# Patient Record
Sex: MALE | Race: WHITE
[De-identification: names, ages, dates, MRNs, and addresses within clinical notes are randomized per-mention and may not be internally consistent; named-entity substitution may affect disease eponyms.]

---

## 2020-10-09 NOTE — NUR
Report called to receiving nurse. Transferred via BED Belongings . 

Special Issues communicated to receiving nurse. AWAKE AND ORIENTED. VITALS STABLE. STATES 
PAIN IMPROVING. TO SURGICAL RM 346A AT THIS TIME.

## 2020-10-09 NOTE — NUR
Patient in room [facility] 346. I have received report from  NITHYA Wright  and had the opportunity to 
ask questions and assume patient care.

## 2020-10-09 NOTE — NUR
Problems reprioritized. Patient report given, questions answered & plan of care reviewed 
with NITHYA ZHANG.

## 2020-10-09 NOTE — NUR
Received from OR via , accompanied by Anesthesiologist DR ANDRADE and report given by 
Anesthesiolgist. AWAKENS TO VOICE. VITALS STABLE. JORDAN PAIN. SENSATION AT THE HIPS. CBI 
WITH PALE CLEAR PINK IN BAG.

## 2020-10-10 NOTE — NUR
Pt w/CBI, c/o abdominal pain. Per order, hand irrigated and small amt of blood clots noted 
during irrigation. Pt tolerated well. Will continue to monitor.

## 2020-10-10 NOTE — NUR
Problems reprioritized. Patient report given, questions answered & plan of care reviewed 
with NITHYA Chavarria.

## 2020-10-10 NOTE — NUR
Patient in room [facility] 346. I have received report from NITHYA Lopez and had the opportunity to 
ask questions and assume patient care.

## 2021-09-07 ENCOUNTER — HOSPITAL ENCOUNTER (OUTPATIENT)
Dept: HOSPITAL 94 - PRE-OP | Age: 71
Discharge: HOME | End: 2021-09-07
Attending: ORTHOPAEDIC SURGERY
Payer: MEDICARE

## 2021-09-07 VITALS — HEIGHT: 74 IN | BODY MASS INDEX: 30.03 KG/M2 | WEIGHT: 234 LBS

## 2021-09-07 DIAGNOSIS — R00.1: ICD-10-CM

## 2021-09-07 DIAGNOSIS — M25.511: ICD-10-CM

## 2021-09-07 DIAGNOSIS — Z20.822: ICD-10-CM

## 2021-09-07 DIAGNOSIS — Z01.812: Primary | ICD-10-CM

## 2021-09-07 DIAGNOSIS — M19.011: ICD-10-CM

## 2021-09-07 LAB
ALBUMIN SERPL BCP-MCNC: 3.7 G/DL (ref 3.4–5)
ALBUMIN/GLOB SERPL: 1 {RATIO} (ref 1.1–1.5)
ALP SERPL-CCNC: 88 IU/L (ref 46–116)
ALT SERPL W P-5'-P-CCNC: 23 U/L (ref 30–65)
ANION GAP SERPL CALCULATED.3IONS-SCNC: 9 MMOL/L (ref 8–16)
AST SERPL W P-5'-P-CCNC: 17 U/L (ref 10–37)
BASOPHILS # BLD AUTO: 0.1 X10'3 (ref 0–0.2)
BASOPHILS NFR BLD AUTO: 0.7 % (ref 0–1)
BILIRUB SERPL-MCNC: 0.7 MG/DL (ref 0–1)
BUN SERPL-MCNC: 16 MG/DL (ref 7–18)
BUN/CREAT SERPL: 10.7 (ref 5.4–32)
CALCIUM SERPL-MCNC: 8.4 MG/DL (ref 8.5–10.1)
CHLORIDE SERPL-SCNC: 107 MMOL/L (ref 99–107)
CO2 SERPL-SCNC: 28.7 MMOL/L (ref 24–32)
CREAT SERPL-MCNC: 1.49 MG/DL (ref 0.6–1.1)
EOSINOPHIL # BLD AUTO: 0 X10'3 (ref 0–0.9)
EOSINOPHIL NFR BLD AUTO: 0.4 % (ref 0–6)
ERYTHROCYTE [DISTWIDTH] IN BLOOD BY AUTOMATED COUNT: 13.9 % (ref 11.5–14.5)
GFR SERPL CREATININE-BSD FRML MDRD: 46 ML/MIN
GLUCOSE SERPL-MCNC: 110 MG/DL (ref 70–104)
HBA1C MFR BLD: 5.9 % (ref 4.5–6.2)
HCT VFR BLD AUTO: 45.3 % (ref 42–52)
HGB BLD-MCNC: 14.8 G/DL (ref 14–17.9)
LYMPHOCYTES # BLD AUTO: 1.6 X10'3 (ref 1.1–4.8)
LYMPHOCYTES NFR BLD AUTO: 19.3 % (ref 21–51)
MCH RBC QN AUTO: 31.2 PG (ref 27–31)
MCHC RBC AUTO-ENTMCNC: 32.6 G/DL (ref 33–36.5)
MCV RBC AUTO: 95.8 FL (ref 78–98)
MONOCYTES # BLD AUTO: 0.5 X10'3 (ref 0–0.9)
MONOCYTES NFR BLD AUTO: 6.1 % (ref 2–12)
NEUTROPHILS # BLD AUTO: 6 X10'3 (ref 1.8–7.7)
NEUTROPHILS NFR BLD AUTO: 73.5 % (ref 42–75)
PLATELET # BLD AUTO: 172 X10'3 (ref 140–440)
PMV BLD AUTO: 10.3 FL (ref 7.4–10.4)
POTASSIUM SERPL-SCNC: 3.9 MMOL/L (ref 3.4–5.1)
PROT SERPL-MCNC: 7.5 G/DL (ref 6.4–8.2)
RBC # BLD AUTO: 4.73 X10'6 (ref 4.7–6.1)
SODIUM SERPL-SCNC: 145 MMOL/L (ref 135–145)

## 2021-09-07 PROCEDURE — 83036 HEMOGLOBIN GLYCOSYLATED A1C: CPT

## 2021-09-07 PROCEDURE — 85025 COMPLETE CBC W/AUTO DIFF WBC: CPT

## 2021-09-07 PROCEDURE — 71046 X-RAY EXAM CHEST 2 VIEWS: CPT

## 2021-09-07 PROCEDURE — 93005 ELECTROCARDIOGRAM TRACING: CPT

## 2021-09-07 PROCEDURE — 87081 CULTURE SCREEN ONLY: CPT

## 2021-09-07 PROCEDURE — 36415 COLL VENOUS BLD VENIPUNCTURE: CPT

## 2021-09-07 PROCEDURE — 80053 COMPREHEN METABOLIC PANEL: CPT

## 2021-12-07 LAB
ALBUMIN SERPL BCP-MCNC: 3.7 G/DL (ref 3.4–5)
ALBUMIN/GLOB SERPL: 0.9 {RATIO} (ref 1.1–1.5)
ALP SERPL-CCNC: 78 IU/L (ref 46–116)
ALT SERPL W P-5'-P-CCNC: 31 U/L (ref 30–65)
ANION GAP SERPL CALCULATED.3IONS-SCNC: 10 MMOL/L (ref 8–16)
AST SERPL W P-5'-P-CCNC: 18 U/L (ref 10–37)
BASOPHILS # BLD AUTO: 0.1 X10'3 (ref 0–0.2)
BASOPHILS NFR BLD AUTO: 0.6 % (ref 0–1)
BILIRUB SERPL-MCNC: 0.6 MG/DL (ref 0–1)
BUN SERPL-MCNC: 16 MG/DL (ref 7–18)
BUN/CREAT SERPL: 11.9 (ref 5.4–32)
CALCIUM SERPL-MCNC: 8.6 MG/DL (ref 8.5–10.1)
CHLORIDE SERPL-SCNC: 105 MMOL/L (ref 99–107)
CO2 SERPL-SCNC: 27.5 MMOL/L (ref 24–32)
CREAT SERPL-MCNC: 1.34 MG/DL (ref 0.6–1.1)
EOSINOPHIL # BLD AUTO: 0.1 X10'3 (ref 0–0.9)
EOSINOPHIL NFR BLD AUTO: 0.9 % (ref 0–6)
ERYTHROCYTE [DISTWIDTH] IN BLOOD BY AUTOMATED COUNT: 13.5 % (ref 11.5–14.5)
GFR SERPL CREATININE-BSD FRML MDRD: 52 ML/MIN
GLUCOSE SERPL-MCNC: 83 MG/DL (ref 70–104)
HBA1C MFR BLD: 6.1 % (ref 4.5–6.2)
HCT VFR BLD AUTO: 44 % (ref 42–52)
HGB BLD-MCNC: 14.8 G/DL (ref 14–17.9)
LYMPHOCYTES # BLD AUTO: 1.9 X10'3 (ref 1.1–4.8)
LYMPHOCYTES NFR BLD AUTO: 20.9 % (ref 21–51)
MCH RBC QN AUTO: 31.8 PG (ref 27–31)
MCHC RBC AUTO-ENTMCNC: 33.5 G/DL (ref 33–36.5)
MCV RBC AUTO: 94.8 FL (ref 78–98)
MONOCYTES # BLD AUTO: 0.8 X10'3 (ref 0–0.9)
MONOCYTES NFR BLD AUTO: 9.1 % (ref 2–12)
NEUTROPHILS # BLD AUTO: 6.3 X10'3 (ref 1.8–7.7)
NEUTROPHILS NFR BLD AUTO: 68.5 % (ref 42–75)
PLATELET # BLD AUTO: 173 X10'3 (ref 140–440)
PMV BLD AUTO: 9.7 FL (ref 7.4–10.4)
POTASSIUM SERPL-SCNC: 4.2 MMOL/L (ref 3.4–5.1)
PROT SERPL-MCNC: 7.8 G/DL (ref 6.4–8.2)
RBC # BLD AUTO: 4.64 X10'6 (ref 4.7–6.1)
SODIUM SERPL-SCNC: 142 MMOL/L (ref 135–145)

## 2021-12-13 ENCOUNTER — HOSPITAL ENCOUNTER (INPATIENT)
Dept: HOSPITAL 94 - PAS IN | Age: 71
LOS: 2 days | Discharge: HOME HEALTH | DRG: 322 | End: 2021-12-15
Attending: ORTHOPAEDIC SURGERY | Admitting: ORTHOPAEDIC SURGERY
Payer: MEDICAID

## 2021-12-13 VITALS — DIASTOLIC BLOOD PRESSURE: 100 MMHG | SYSTOLIC BLOOD PRESSURE: 154 MMHG

## 2021-12-13 VITALS — DIASTOLIC BLOOD PRESSURE: 75 MMHG | SYSTOLIC BLOOD PRESSURE: 125 MMHG

## 2021-12-13 VITALS — DIASTOLIC BLOOD PRESSURE: 75 MMHG | SYSTOLIC BLOOD PRESSURE: 121 MMHG

## 2021-12-13 VITALS — DIASTOLIC BLOOD PRESSURE: 81 MMHG | SYSTOLIC BLOOD PRESSURE: 135 MMHG

## 2021-12-13 VITALS — DIASTOLIC BLOOD PRESSURE: 77 MMHG | SYSTOLIC BLOOD PRESSURE: 122 MMHG

## 2021-12-13 VITALS — SYSTOLIC BLOOD PRESSURE: 139 MMHG | DIASTOLIC BLOOD PRESSURE: 85 MMHG

## 2021-12-13 VITALS — DIASTOLIC BLOOD PRESSURE: 77 MMHG | SYSTOLIC BLOOD PRESSURE: 120 MMHG

## 2021-12-13 VITALS — WEIGHT: 240 LBS | BODY MASS INDEX: 30.8 KG/M2 | HEIGHT: 74 IN

## 2021-12-13 VITALS — SYSTOLIC BLOOD PRESSURE: 127 MMHG | DIASTOLIC BLOOD PRESSURE: 82 MMHG

## 2021-12-13 VITALS — SYSTOLIC BLOOD PRESSURE: 165 MMHG | DIASTOLIC BLOOD PRESSURE: 104 MMHG

## 2021-12-13 VITALS — DIASTOLIC BLOOD PRESSURE: 74 MMHG | SYSTOLIC BLOOD PRESSURE: 125 MMHG

## 2021-12-13 VITALS — DIASTOLIC BLOOD PRESSURE: 94 MMHG | SYSTOLIC BLOOD PRESSURE: 135 MMHG

## 2021-12-13 VITALS — DIASTOLIC BLOOD PRESSURE: 82 MMHG | SYSTOLIC BLOOD PRESSURE: 125 MMHG

## 2021-12-13 VITALS — DIASTOLIC BLOOD PRESSURE: 72 MMHG | SYSTOLIC BLOOD PRESSURE: 141 MMHG

## 2021-12-13 VITALS — SYSTOLIC BLOOD PRESSURE: 119 MMHG | DIASTOLIC BLOOD PRESSURE: 84 MMHG

## 2021-12-13 VITALS — SYSTOLIC BLOOD PRESSURE: 123 MMHG | DIASTOLIC BLOOD PRESSURE: 83 MMHG

## 2021-12-13 VITALS — SYSTOLIC BLOOD PRESSURE: 148 MMHG | DIASTOLIC BLOOD PRESSURE: 99 MMHG

## 2021-12-13 VITALS — SYSTOLIC BLOOD PRESSURE: 142 MMHG | DIASTOLIC BLOOD PRESSURE: 89 MMHG

## 2021-12-13 VITALS — SYSTOLIC BLOOD PRESSURE: 115 MMHG | DIASTOLIC BLOOD PRESSURE: 81 MMHG

## 2021-12-13 VITALS — SYSTOLIC BLOOD PRESSURE: 128 MMHG | DIASTOLIC BLOOD PRESSURE: 84 MMHG

## 2021-12-13 VITALS — SYSTOLIC BLOOD PRESSURE: 137 MMHG | DIASTOLIC BLOOD PRESSURE: 89 MMHG

## 2021-12-13 VITALS — DIASTOLIC BLOOD PRESSURE: 83 MMHG | SYSTOLIC BLOOD PRESSURE: 122 MMHG

## 2021-12-13 VITALS — SYSTOLIC BLOOD PRESSURE: 144 MMHG | DIASTOLIC BLOOD PRESSURE: 93 MMHG

## 2021-12-13 VITALS — DIASTOLIC BLOOD PRESSURE: 86 MMHG | SYSTOLIC BLOOD PRESSURE: 127 MMHG

## 2021-12-13 DIAGNOSIS — Z79.899: ICD-10-CM

## 2021-12-13 DIAGNOSIS — M65.811: ICD-10-CM

## 2021-12-13 DIAGNOSIS — M19.011: Primary | ICD-10-CM

## 2021-12-13 DIAGNOSIS — M75.101: ICD-10-CM

## 2021-12-13 DIAGNOSIS — D62: ICD-10-CM

## 2021-12-13 PROCEDURE — 3E0T33Z INTRODUCTION OF ANTI-INFLAMMATORY INTO PERIPHERAL NERVES AND PLEXI, PERCUTANEOUS APPROACH: ICD-10-PCS | Performed by: ANESTHESIOLOGY

## 2021-12-13 PROCEDURE — 97161 PT EVAL LOW COMPLEX 20 MIN: CPT

## 2021-12-13 PROCEDURE — 82948 REAGENT STRIP/BLOOD GLUCOSE: CPT

## 2021-12-13 PROCEDURE — 97110 THERAPEUTIC EXERCISES: CPT

## 2021-12-13 PROCEDURE — 3E0T3BZ INTRODUCTION OF ANESTHETIC AGENT INTO PERIPHERAL NERVES AND PLEXI, PERCUTANEOUS APPROACH: ICD-10-PCS | Performed by: ANESTHESIOLOGY

## 2021-12-13 PROCEDURE — 0RRJ00Z REPLACEMENT OF RIGHT SHOULDER JOINT WITH REVERSE BALL AND SOCKET SYNTHETIC SUBSTITUTE, OPEN APPROACH: ICD-10-PCS | Performed by: ORTHOPAEDIC SURGERY

## 2021-12-13 PROCEDURE — 80051 ELECTROLYTE PANEL: CPT

## 2021-12-13 PROCEDURE — 97530 THERAPEUTIC ACTIVITIES: CPT

## 2021-12-13 PROCEDURE — 85025 COMPLETE CBC W/AUTO DIFF WBC: CPT

## 2021-12-13 PROCEDURE — 80053 COMPREHEN METABOLIC PANEL: CPT

## 2021-12-13 PROCEDURE — 87081 CULTURE SCREEN ONLY: CPT

## 2021-12-13 PROCEDURE — 97116 GAIT TRAINING THERAPY: CPT

## 2021-12-13 PROCEDURE — 36415 COLL VENOUS BLD VENIPUNCTURE: CPT

## 2021-12-13 PROCEDURE — 83036 HEMOGLOBIN GLYCOSYLATED A1C: CPT

## 2021-12-13 PROCEDURE — 0LS30ZZ REPOSITION RIGHT UPPER ARM TENDON, OPEN APPROACH: ICD-10-PCS | Performed by: ORTHOPAEDIC SURGERY

## 2021-12-13 RX ADMIN — MEPERIDINE HYDROCHLORIDE PRN MG: 25 INJECTION, SOLUTION INTRAMUSCULAR; INTRAVENOUS; SUBCUTANEOUS at 14:10

## 2021-12-13 RX ADMIN — OXYCODONE PRN MG: 5 TABLET ORAL at 17:31

## 2021-12-13 RX ADMIN — SODIUM CHLORIDE AND POTASSIUM CHLORIDE SCH MLS/HR: 4.5; 1.49 INJECTION, SOLUTION INTRAVENOUS at 20:48

## 2021-12-13 RX ADMIN — MEPERIDINE HYDROCHLORIDE PRN MG: 25 INJECTION, SOLUTION INTRAMUSCULAR; INTRAVENOUS; SUBCUTANEOUS at 15:23

## 2021-12-13 RX ADMIN — CEFAZOLIN SODIUM SCH MLS/HR: 1 SOLUTION INTRAVENOUS at 17:30

## 2021-12-13 RX ADMIN — DOCUSATE SODIUM SCH MG: 100 CAPSULE, LIQUID FILLED ORAL at 19:46

## 2021-12-13 RX ADMIN — ROPIVACAINE HYDROCHLORIDE SCH MLS/HR: 2 INJECTION, SOLUTION EPIDURAL; INFILTRATION; PERINEURAL at 14:38

## 2021-12-13 RX ADMIN — SODIUM CHLORIDE AND POTASSIUM CHLORIDE SCH MLS/HR: 4.5; 1.49 INJECTION, SOLUTION INTRAVENOUS at 14:25

## 2021-12-13 NOTE — NUR
Problems reprioritized. Patient report given, questions answered & plan of care reviewed 
with Manasa bhatia.

## 2021-12-13 NOTE — NUR
Patient in room [facility] 349A. I have received report from  NITHYA Bourne and had the opportunity 
to ask questions and assume patient care.

## 2021-12-13 NOTE — NUR
Patient in room PAS . I have received report from Milena bhatia   and had the opportunity to 
ask questions and assume patient care.

## 2021-12-13 NOTE — NUR
Received from OR via BED, accompanied by Anesthesiologist DR GEORGE and report given by 
Anesthesiologist AND OR RN. PT DROWSY, NO S/S OF DISTRESS/DISCOMFORT. RIGHT SHOULDER W/DRSG 
CDI, SHOULDER WRAP, ICE PACK, IMMOBILIZER IN PLACE. 

-------------------------------------------------------------------------------

Addendum: 12/13/21 at 1424 by Milena Rey RN

-------------------------------------------------------------------------------

Amended: Links added.

## 2021-12-13 NOTE — NUR
Report called to receiving nurse. Transferred via BED BY ORDERLALITO'S W/2 BAGS OF BELONGINGS 
SENT TO ROOM 349A, RECEIVING RN NOTIFIED OF PTS ARRIVAL. PT STATES PAIN IS IMPROVING.

Special Issues communicated to receiving nurse. YES.

-------------------------------------------------------------------------------

Addendum: 12/13/21 at 1633 by Milena Rey RN

-------------------------------------------------------------------------------

Amended: Links added.

## 2021-12-14 VITALS — SYSTOLIC BLOOD PRESSURE: 147 MMHG | DIASTOLIC BLOOD PRESSURE: 96 MMHG

## 2021-12-14 VITALS — DIASTOLIC BLOOD PRESSURE: 88 MMHG | SYSTOLIC BLOOD PRESSURE: 130 MMHG

## 2021-12-14 VITALS — SYSTOLIC BLOOD PRESSURE: 129 MMHG | DIASTOLIC BLOOD PRESSURE: 90 MMHG

## 2021-12-14 VITALS — SYSTOLIC BLOOD PRESSURE: 160 MMHG | DIASTOLIC BLOOD PRESSURE: 63 MMHG

## 2021-12-14 VITALS — DIASTOLIC BLOOD PRESSURE: 66 MMHG | SYSTOLIC BLOOD PRESSURE: 121 MMHG

## 2021-12-14 LAB
ANION GAP SERPL CALCULATED.3IONS-SCNC: 9 MMOL/L (ref 8–16)
BASOPHILS # BLD AUTO: 0 X10'3 (ref 0–0.2)
BASOPHILS NFR BLD AUTO: 0.1 % (ref 0–1)
CHLORIDE SERPL-SCNC: 103 MMOL/L (ref 99–107)
CO2 SERPL-SCNC: 25.7 MMOL/L (ref 24–32)
EOSINOPHIL # BLD AUTO: 0 X10'3 (ref 0–0.9)
EOSINOPHIL NFR BLD AUTO: 0 % (ref 0–6)
ERYTHROCYTE [DISTWIDTH] IN BLOOD BY AUTOMATED COUNT: 13.6 % (ref 11.5–14.5)
HCT VFR BLD AUTO: 40.2 % (ref 42–52)
HGB BLD-MCNC: 13.2 G/DL (ref 14–17.9)
LYMPHOCYTES # BLD AUTO: 0.9 X10'3 (ref 1.1–4.8)
LYMPHOCYTES NFR BLD AUTO: 6.4 % (ref 21–51)
MCH RBC QN AUTO: 31.6 PG (ref 27–31)
MCHC RBC AUTO-ENTMCNC: 33 G/DL (ref 33–36.5)
MCV RBC AUTO: 95.8 FL (ref 78–98)
MONOCYTES # BLD AUTO: 0.8 X10'3 (ref 0–0.9)
MONOCYTES NFR BLD AUTO: 5.8 % (ref 2–12)
NEUTROPHILS # BLD AUTO: 12.7 X10'3 (ref 1.8–7.7)
NEUTROPHILS NFR BLD AUTO: 87.7 % (ref 42–75)
PLATELET # BLD AUTO: 174 X10'3 (ref 140–440)
PMV BLD AUTO: 10.2 FL (ref 7.4–10.4)
POTASSIUM SERPL-SCNC: 4.3 MMOL/L (ref 3.5–5.1)
RBC # BLD AUTO: 4.19 X10'6 (ref 4.7–6.1)
SODIUM SERPL-SCNC: 138 MMOL/L (ref 135–145)
WBC # BLD AUTO: 14.5 X10'3 (ref 4.5–11)

## 2021-12-14 RX ADMIN — DOCUSATE SODIUM SCH MG: 100 CAPSULE, LIQUID FILLED ORAL at 10:13

## 2021-12-14 RX ADMIN — SODIUM CHLORIDE AND POTASSIUM CHLORIDE SCH MLS/HR: 4.5; 1.49 INJECTION, SOLUTION INTRAVENOUS at 14:25

## 2021-12-14 RX ADMIN — DOCUSATE SODIUM SCH MG: 100 CAPSULE, LIQUID FILLED ORAL at 20:35

## 2021-12-14 RX ADMIN — OXYCODONE PRN MG: 5 TABLET ORAL at 15:50

## 2021-12-14 RX ADMIN — OXYCODONE PRN MG: 5 TABLET ORAL at 10:14

## 2021-12-14 RX ADMIN — CEFAZOLIN SODIUM SCH MLS/HR: 1 SOLUTION INTRAVENOUS at 00:52

## 2021-12-14 RX ADMIN — SODIUM CHLORIDE AND POTASSIUM CHLORIDE SCH MLS/HR: 4.5; 1.49 INJECTION, SOLUTION INTRAVENOUS at 03:57

## 2021-12-14 RX ADMIN — PANTOPRAZOLE SODIUM SCH MG: 40 TABLET, DELAYED RELEASE ORAL at 10:12

## 2021-12-14 NOTE — NUR
Problems reprioritized. Patient report given, questions answered & plan of care reviewed 
with CASE Crowe.

## 2021-12-14 NOTE — NUR
Patient in room [facility] 349. I have received report from NITHYA Goel   and had the opportunity to 
ask questions and assume patient care.

## 2021-12-14 NOTE — NUR
Problems reprioritized. Patient report given, questions answered & plan of care reviewed 
with NITHYA Arguelles.

## 2021-12-14 NOTE — NUR
Diabetes consult: Noted A1C 6.1 DM ed not indicated at this time. Pt s/p reverse 
arthroplasty of R shoulder this admit, written high protein diet ed w/ RD contact info 
placed in pt chart




-------------------------------------------------------------------------------

Addendum: 12/14/21 at 1328 by Aroldo Montenegro RD

-------------------------------------------------------------------------------

Amended: Links added.

## 2021-12-15 VITALS — SYSTOLIC BLOOD PRESSURE: 122 MMHG | DIASTOLIC BLOOD PRESSURE: 84 MMHG

## 2021-12-15 VITALS — DIASTOLIC BLOOD PRESSURE: 70 MMHG | SYSTOLIC BLOOD PRESSURE: 161 MMHG

## 2021-12-15 VITALS — SYSTOLIC BLOOD PRESSURE: 107 MMHG | DIASTOLIC BLOOD PRESSURE: 69 MMHG

## 2021-12-15 LAB
BASOPHILS # BLD AUTO: 0 X10'3 (ref 0–0.2)
BASOPHILS NFR BLD AUTO: 0.2 % (ref 0–1)
EOSINOPHIL # BLD AUTO: 0 X10'3 (ref 0–0.9)
EOSINOPHIL NFR BLD AUTO: 0 % (ref 0–6)
ERYTHROCYTE [DISTWIDTH] IN BLOOD BY AUTOMATED COUNT: 13.5 % (ref 11.5–14.5)
HCT VFR BLD AUTO: 35 % (ref 42–52)
HGB BLD-MCNC: 11.8 G/DL (ref 14–17.9)
LYMPHOCYTES # BLD AUTO: 1 X10'3 (ref 1.1–4.8)
LYMPHOCYTES NFR BLD AUTO: 6.6 % (ref 21–51)
MCH RBC QN AUTO: 32 PG (ref 27–31)
MCHC RBC AUTO-ENTMCNC: 33.7 G/DL (ref 33–36.5)
MCV RBC AUTO: 94.9 FL (ref 78–98)
MONOCYTES # BLD AUTO: 1.2 X10'3 (ref 0–0.9)
MONOCYTES NFR BLD AUTO: 7.7 % (ref 2–12)
NEUTROPHILS # BLD AUTO: 13.1 X10'3 (ref 1.8–7.7)
NEUTROPHILS NFR BLD AUTO: 85.5 % (ref 42–75)
PLATELET # BLD AUTO: 149 X10'3 (ref 140–440)
PMV BLD AUTO: 10.3 FL (ref 7.4–10.4)
RBC # BLD AUTO: 3.69 X10'6 (ref 4.7–6.1)
WBC # BLD AUTO: 15.3 X10'3 (ref 4.5–11)

## 2021-12-15 RX ADMIN — SODIUM CHLORIDE AND POTASSIUM CHLORIDE SCH MLS/HR: 4.5; 1.49 INJECTION, SOLUTION INTRAVENOUS at 06:25

## 2021-12-15 RX ADMIN — PANTOPRAZOLE SODIUM SCH MG: 40 TABLET, DELAYED RELEASE ORAL at 07:52

## 2021-12-15 RX ADMIN — DOCUSATE SODIUM SCH MG: 100 CAPSULE, LIQUID FILLED ORAL at 07:51

## 2021-12-15 RX ADMIN — ROPIVACAINE HYDROCHLORIDE SCH MLS/HR: 2 INJECTION, SOLUTION EPIDURAL; INFILTRATION; PERINEURAL at 13:33

## 2021-12-15 RX ADMIN — SODIUM CHLORIDE AND POTASSIUM CHLORIDE SCH MLS/HR: 4.5; 1.49 INJECTION, SOLUTION INTRAVENOUS at 01:11

## 2021-12-15 NOTE — NUR
Patient in room [facility] 349. I have received report from CASE Crowe   and had the opportunity 
to ask questions and assume patient care.

## 2021-12-15 NOTE — NUR
Pt stated he was not DC today.  Spoke with Nicole Martin NP who indicated she and Dr Elena indicate he is to DC today.  Phoned Pt's caregiver to arrange , msg left, 
awaiting call back.

## 2021-12-16 NOTE — NUR
Contacted patients home phone number and spoke to  Patients care give Abby to advise 
patient left his walker in the ER. Per Abby he is fine he has a walker that he can use at 
home and does not need it. Called Er admitting and spoke to the ER nobody is aware of this 
but they will tell someone if they come across the walker .